# Patient Record
Sex: FEMALE | Race: ASIAN | NOT HISPANIC OR LATINO
[De-identification: names, ages, dates, MRNs, and addresses within clinical notes are randomized per-mention and may not be internally consistent; named-entity substitution may affect disease eponyms.]

---

## 2017-09-19 ENCOUNTER — NON-APPOINTMENT (OUTPATIENT)
Age: 72
End: 2017-09-19

## 2017-09-19 ENCOUNTER — APPOINTMENT (OUTPATIENT)
Dept: CARDIOLOGY | Facility: CLINIC | Age: 72
End: 2017-09-19
Payer: MEDICARE

## 2017-09-19 ENCOUNTER — FORM ENCOUNTER (OUTPATIENT)
Age: 72
End: 2017-09-19

## 2017-09-19 VITALS
RESPIRATION RATE: 16 BRPM | TEMPERATURE: 98 F | OXYGEN SATURATION: 98 % | HEART RATE: 47 BPM | DIASTOLIC BLOOD PRESSURE: 91 MMHG | WEIGHT: 109 LBS | BODY MASS INDEX: 22.27 KG/M2 | SYSTOLIC BLOOD PRESSURE: 128 MMHG

## 2017-09-19 DIAGNOSIS — I07.1 RHEUMATIC TRICUSPID INSUFFICIENCY: ICD-10-CM

## 2017-09-19 PROCEDURE — 99214 OFFICE O/P EST MOD 30 MIN: CPT

## 2017-09-19 PROCEDURE — 93306 TTE W/DOPPLER COMPLETE: CPT

## 2017-09-19 PROCEDURE — 93000 ELECTROCARDIOGRAM COMPLETE: CPT

## 2018-06-13 ENCOUNTER — APPOINTMENT (OUTPATIENT)
Dept: CARDIOLOGY | Facility: CLINIC | Age: 73
End: 2018-06-13

## 2018-06-20 ENCOUNTER — NON-APPOINTMENT (OUTPATIENT)
Age: 73
End: 2018-06-20

## 2018-06-20 ENCOUNTER — APPOINTMENT (OUTPATIENT)
Dept: CARDIOLOGY | Facility: CLINIC | Age: 73
End: 2018-06-20
Payer: MEDICARE

## 2018-06-20 VITALS
RESPIRATION RATE: 17 BRPM | SYSTOLIC BLOOD PRESSURE: 146 MMHG | BODY MASS INDEX: 22.88 KG/M2 | TEMPERATURE: 97.7 F | WEIGHT: 112 LBS | HEART RATE: 47 BPM | DIASTOLIC BLOOD PRESSURE: 65 MMHG | OXYGEN SATURATION: 97 %

## 2018-06-20 PROCEDURE — 99215 OFFICE O/P EST HI 40 MIN: CPT | Mod: 25

## 2018-06-20 PROCEDURE — 99214 OFFICE O/P EST MOD 30 MIN: CPT | Mod: 25

## 2018-06-20 PROCEDURE — 93015 CV STRESS TEST SUPVJ I&R: CPT

## 2018-06-20 PROCEDURE — 93000 ELECTROCARDIOGRAM COMPLETE: CPT | Mod: 59

## 2018-12-15 NOTE — PHYSICAL EXAM
[General Appearance - Well Developed] : well developed [Normal Appearance] : normal appearance [Well Groomed] : well groomed [General Appearance - Well Nourished] : well nourished [No Deformities] : no deformities [General Appearance - In No Acute Distress] : no acute distress [Normal Conjunctiva] : the conjunctiva exhibited no abnormalities [Eyelids - No Xanthelasma] : the eyelids demonstrated no xanthelasmas [Normal Oral Mucosa] : normal oral mucosa [No Oral Pallor] : no oral pallor [No Oral Cyanosis] : no oral cyanosis [Normal Jugular Venous A Waves Present] : normal jugular venous A waves present [Normal Jugular Venous V Waves Present] : normal jugular venous V waves present [No Jugular Venous Zavala A Waves] : no jugular venous zavala A waves [Respiration, Rhythm And Depth] : normal respiratory rhythm and effort [Exaggerated Use Of Accessory Muscles For Inspiration] : no accessory muscle use [Auscultation Breath Sounds / Voice Sounds] : lungs were clear to auscultation bilaterally [Heart Rate And Rhythm] : heart rate and rhythm were normal [Heart Sounds] : normal S1 and S2 [Murmurs] : no murmurs present [Arterial Pulses Normal] : the arterial pulses were normal [Edema] : no peripheral edema present [Abdomen Soft] : soft [Abdomen Tenderness] : non-tender [Abdomen Mass (___ Cm)] : no abdominal mass palpated [Abnormal Walk] : normal gait [Gait - Sufficient For Exercise Testing] : the gait was sufficient for exercise testing [Nail Clubbing] : no clubbing of the fingernails [Cyanosis, Localized] : no localized cyanosis [Petechial Hemorrhages (___cm)] : no petechial hemorrhages [] : no ischemic changes [Oriented To Time, Place, And Person] : oriented to person, place, and time [Affect] : the affect was normal [Mood] : the mood was normal [No Anxiety] : not feeling anxious

## 2018-12-17 ENCOUNTER — APPOINTMENT (OUTPATIENT)
Dept: CARDIOLOGY | Facility: CLINIC | Age: 73
End: 2018-12-17
Payer: MEDICARE

## 2018-12-17 VITALS
RESPIRATION RATE: 17 BRPM | TEMPERATURE: 97.6 F | SYSTOLIC BLOOD PRESSURE: 124 MMHG | OXYGEN SATURATION: 99 % | DIASTOLIC BLOOD PRESSURE: 73 MMHG | WEIGHT: 112 LBS | HEART RATE: 50 BPM | BODY MASS INDEX: 22.88 KG/M2

## 2018-12-17 PROCEDURE — 99214 OFFICE O/P EST MOD 30 MIN: CPT

## 2019-02-05 NOTE — REASON FOR VISIT
[Follow-Up - Clinic] : a clinic follow-up of [Abnormal ECG] : an abnormal ECG [FreeTextEntry1] : bradycardia

## 2019-02-05 NOTE — DISCUSSION/SUMMARY
[FreeTextEntry1] : 73-year-old female with HTN, HLD, bradycardia, osteoporosis, presents for followup.  Patient was last seen on  6/20/18.  Patient underwent a treadmill stress test and completed 5.5 minutes of Cedric protocol.  Her HR manuel appropriately, reaching 81% target HR.  She is on ASA and statin therapy for cardioprotection.  Patient has been stable.  Patient denies CP, SOB, palpitations, or lightheadedness. \par \par (1) Sinus bradycardia - Patient is asymptomatic.  Patient underwent a treadmill stress test in 6/2018 and completed 5.5 minutes of Cedric protocol.  Her HR manuel appropriately, reaching 81% target HR.  PPM is not indicated at this time and may be needed in the future.  I advised patient to look out for symptoms of shortness of breath or lightheadedness.\par \par (2) Followup - 1 year.

## 2019-02-05 NOTE — HISTORY OF PRESENT ILLNESS
[FreeTextEntry1] : 73-year-old female with HTN, HLD, bradycardia, osteoporosis, presents for followup.  Patient was last seen on  6/20/18.  Patient underwent a treadmill stress test and completed 5.5 minutes of Cedric protocol.  Her HR manuel appropriately, reaching 81% target HR.  She is on ASA and statin therapy for cardioprotection.  Patient has been stable.  Patient denies CP, SOB, palpitations, or lightheadedness.

## 2019-06-10 ENCOUNTER — APPOINTMENT (OUTPATIENT)
Dept: CARDIOLOGY | Facility: CLINIC | Age: 74
End: 2019-06-10
Payer: MEDICARE

## 2019-06-10 VITALS
RESPIRATION RATE: 17 BRPM | WEIGHT: 113 LBS | DIASTOLIC BLOOD PRESSURE: 66 MMHG | OXYGEN SATURATION: 98 % | SYSTOLIC BLOOD PRESSURE: 119 MMHG | HEART RATE: 45 BPM | TEMPERATURE: 97.6 F | BODY MASS INDEX: 23.09 KG/M2

## 2019-06-10 DIAGNOSIS — Z01.810 ENCOUNTER FOR PREPROCEDURAL CARDIOVASCULAR EXAMINATION: ICD-10-CM

## 2019-06-10 PROCEDURE — 93224 XTRNL ECG REC UP TO 48 HRS: CPT

## 2019-06-10 PROCEDURE — 99214 OFFICE O/P EST MOD 30 MIN: CPT

## 2019-06-10 NOTE — PHYSICAL EXAM
[General Appearance - Well Developed] : well developed [Normal Appearance] : normal appearance [Well Groomed] : well groomed [General Appearance - Well Nourished] : well nourished [No Deformities] : no deformities [General Appearance - In No Acute Distress] : no acute distress [Normal Conjunctiva] : the conjunctiva exhibited no abnormalities [Normal Oral Mucosa] : normal oral mucosa [Eyelids - No Xanthelasma] : the eyelids demonstrated no xanthelasmas [No Oral Pallor] : no oral pallor [No Oral Cyanosis] : no oral cyanosis [Normal Jugular Venous A Waves Present] : normal jugular venous A waves present [Normal Jugular Venous V Waves Present] : normal jugular venous V waves present [Respiration, Rhythm And Depth] : normal respiratory rhythm and effort [No Jugular Venous Zavala A Waves] : no jugular venous zavala A waves [Auscultation Breath Sounds / Voice Sounds] : lungs were clear to auscultation bilaterally [Exaggerated Use Of Accessory Muscles For Inspiration] : no accessory muscle use [Heart Sounds] : normal S1 and S2 [Heart Rate And Rhythm] : heart rate and rhythm were normal [Murmurs] : no murmurs present [Arterial Pulses Normal] : the arterial pulses were normal [Edema] : no peripheral edema present [Abdomen Soft] : soft [Abdomen Tenderness] : non-tender [Abdomen Mass (___ Cm)] : no abdominal mass palpated [Abnormal Walk] : normal gait [Gait - Sufficient For Exercise Testing] : the gait was sufficient for exercise testing [Nail Clubbing] : no clubbing of the fingernails [Cyanosis, Localized] : no localized cyanosis [] : no ischemic changes [Petechial Hemorrhages (___cm)] : no petechial hemorrhages [Oriented To Time, Place, And Person] : oriented to person, place, and time [Mood] : the mood was normal [Affect] : the affect was normal [No Anxiety] : not feeling anxious

## 2019-06-17 ENCOUNTER — NON-APPOINTMENT (OUTPATIENT)
Age: 74
End: 2019-06-17

## 2019-06-24 ENCOUNTER — RESULT REVIEW (OUTPATIENT)
Age: 74
End: 2019-06-24

## 2019-06-24 NOTE — REASON FOR VISIT
[Follow-Up - Clinic] : a clinic follow-up of [Abnormal ECG] : an abnormal ECG [FreeTextEntry1] : bradycardia, cardiac clearance prior to cataract surgery

## 2019-06-24 NOTE — HISTORY OF PRESENT ILLNESS
[FreeTextEntry1] : 74-year-old female with HTN, HLD, bradycardia, osteoporosis, presents for followup.  Patient was last seen on 12/17/18.  Patient underwent a treadmill stress test in 6/2018 and completed 5.5 minutes of Cedric protocol.  Her HR manuel appropriately, reaching 81% target HR.  PPM was not felt to be indicated at this time but may be needed in the future.  Patient needs cardiac clearance prior to cataract surgery.  Patient was noted to have HR of 43 on ECG.  Patient is asymptomatic.  Patient denies CP, SOB, palpitations, or lightheadedness.

## 2019-06-24 NOTE — DISCUSSION/SUMMARY
[FreeTextEntry1] : 74-year-old female with HTN, HLD, bradycardia, osteoporosis, presents for followup.  Patient was last seen on 12/17/18.  Patient underwent a treadmill stress test in 6/2018 and completed 5.5 minutes of Cedric protocol.  Her HR manuel appropriately, reaching 81% target HR.  PPM was not felt to be indicated at this time but may be needed in the future.  Patient needs cardiac clearance prior to cataract surgery.  Patient was noted to have HR of 43 on ECG.  Patient is asymptomatic.  Patient denies CP, SOB, palpitations, or lightheadedness.  \par \par (1) Cardiac clearance prior to cataract surgery - Patient has stable sinus bradycardia.  Patient denies exertional symptoms.  Patient wore a Holter and it showed average HR of 49, APC's and short runs of PAT (6 beats the longest), without significant pauses.  Patient is therefore cleared for surgery and anesthesia.  Low dose Atropine 0.25 mg may be given as needed if bradycardia is causing hypotension zuleima-operatively.  \par   \par (2) Sinus bradycardia - Patient is asymptomatic.  PPM is not indicated at this time but may be needed in the future. \par \par (3) Followup - 1 year.

## 2020-08-26 NOTE — PHYSICAL EXAM
[General Appearance - Well Developed] : well developed [Normal Appearance] : normal appearance [Well Groomed] : well groomed [No Deformities] : no deformities [General Appearance - Well Nourished] : well nourished [Normal Conjunctiva] : the conjunctiva exhibited no abnormalities [General Appearance - In No Acute Distress] : no acute distress [Eyelids - No Xanthelasma] : the eyelids demonstrated no xanthelasmas [Normal Oral Mucosa] : normal oral mucosa [No Oral Pallor] : no oral pallor [No Oral Cyanosis] : no oral cyanosis [Normal Jugular Venous A Waves Present] : normal jugular venous A waves present [Normal Jugular Venous V Waves Present] : normal jugular venous V waves present [No Jugular Venous Zavala A Waves] : no jugular venous zavala A waves [Respiration, Rhythm And Depth] : normal respiratory rhythm and effort [Exaggerated Use Of Accessory Muscles For Inspiration] : no accessory muscle use [Auscultation Breath Sounds / Voice Sounds] : lungs were clear to auscultation bilaterally [Heart Rate And Rhythm] : heart rate and rhythm were normal [Heart Sounds] : normal S1 and S2 [Arterial Pulses Normal] : the arterial pulses were normal [Murmurs] : no murmurs present [Edema] : no peripheral edema present [Abdomen Soft] : soft [Abdomen Tenderness] : non-tender [Abdomen Mass (___ Cm)] : no abdominal mass palpated [Abnormal Walk] : normal gait [Gait - Sufficient For Exercise Testing] : the gait was sufficient for exercise testing [Cyanosis, Localized] : no localized cyanosis [Nail Clubbing] : no clubbing of the fingernails [Petechial Hemorrhages (___cm)] : no petechial hemorrhages [] : no ischemic changes [Oriented To Time, Place, And Person] : oriented to person, place, and time [Affect] : the affect was normal [Mood] : the mood was normal [No Anxiety] : not feeling anxious

## 2020-08-27 ENCOUNTER — NON-APPOINTMENT (OUTPATIENT)
Age: 75
End: 2020-08-27

## 2020-08-27 ENCOUNTER — APPOINTMENT (OUTPATIENT)
Dept: CARDIOLOGY | Facility: CLINIC | Age: 75
End: 2020-08-27
Payer: MEDICARE

## 2020-08-27 VITALS
SYSTOLIC BLOOD PRESSURE: 148 MMHG | DIASTOLIC BLOOD PRESSURE: 76 MMHG | HEART RATE: 66 BPM | OXYGEN SATURATION: 98 % | WEIGHT: 109 LBS | BODY MASS INDEX: 22.27 KG/M2 | TEMPERATURE: 97.3 F | RESPIRATION RATE: 18 BRPM

## 2020-08-27 DIAGNOSIS — R94.31 ABNORMAL ELECTROCARDIOGRAM [ECG] [EKG]: ICD-10-CM

## 2020-08-27 PROCEDURE — 93306 TTE W/DOPPLER COMPLETE: CPT

## 2020-08-27 PROCEDURE — 99214 OFFICE O/P EST MOD 30 MIN: CPT

## 2020-08-27 PROCEDURE — 93000 ELECTROCARDIOGRAM COMPLETE: CPT

## 2020-09-01 ENCOUNTER — NON-APPOINTMENT (OUTPATIENT)
Age: 75
End: 2020-09-01

## 2021-09-25 PROBLEM — I47.1 PAT (PAROXYSMAL ATRIAL TACHYCARDIA): Status: ACTIVE | Noted: 2018-12-28

## 2021-09-25 NOTE — PHYSICAL EXAM
[General Appearance - Well Developed] : well developed [Normal Appearance] : normal appearance [Well Groomed] : well groomed [No Deformities] : no deformities [General Appearance - Well Nourished] : well nourished [General Appearance - In No Acute Distress] : no acute distress [Normal Conjunctiva] : the conjunctiva exhibited no abnormalities [Eyelids - No Xanthelasma] : the eyelids demonstrated no xanthelasmas [Normal Oral Mucosa] : normal oral mucosa [No Oral Pallor] : no oral pallor [No Oral Cyanosis] : no oral cyanosis [Normal Jugular Venous A Waves Present] : normal jugular venous A waves present [Normal Jugular Venous V Waves Present] : normal jugular venous V waves present [No Jugular Venous Zavala A Waves] : no jugular venous zavala A waves [Respiration, Rhythm And Depth] : normal respiratory rhythm and effort [Exaggerated Use Of Accessory Muscles For Inspiration] : no accessory muscle use [Heart Rate And Rhythm] : heart rate and rhythm were normal [Auscultation Breath Sounds / Voice Sounds] : lungs were clear to auscultation bilaterally [Heart Sounds] : normal S1 and S2 [Murmurs] : no murmurs present [Arterial Pulses Normal] : the arterial pulses were normal [Edema] : no peripheral edema present [Abdomen Soft] : soft [Abdomen Tenderness] : non-tender [Abdomen Mass (___ Cm)] : no abdominal mass palpated [Abnormal Walk] : normal gait [Gait - Sufficient For Exercise Testing] : the gait was sufficient for exercise testing [Nail Clubbing] : no clubbing of the fingernails [Cyanosis, Localized] : no localized cyanosis [Petechial Hemorrhages (___cm)] : no petechial hemorrhages [] : no ischemic changes [Oriented To Time, Place, And Person] : oriented to person, place, and time [Affect] : the affect was normal [Mood] : the mood was normal [No Anxiety] : not feeling anxious

## 2021-09-27 ENCOUNTER — APPOINTMENT (OUTPATIENT)
Dept: CARDIOLOGY | Facility: CLINIC | Age: 76
End: 2021-09-27
Payer: MEDICARE

## 2021-09-27 VITALS
OXYGEN SATURATION: 98 % | TEMPERATURE: 97.6 F | RESPIRATION RATE: 18 BRPM | WEIGHT: 111 LBS | SYSTOLIC BLOOD PRESSURE: 137 MMHG | HEART RATE: 50 BPM | BODY MASS INDEX: 22.68 KG/M2 | DIASTOLIC BLOOD PRESSURE: 59 MMHG

## 2021-09-27 DIAGNOSIS — I47.1 SUPRAVENTRICULAR TACHYCARDIA: ICD-10-CM

## 2021-09-27 PROCEDURE — 99214 OFFICE O/P EST MOD 30 MIN: CPT

## 2021-09-30 ENCOUNTER — NON-APPOINTMENT (OUTPATIENT)
Age: 76
End: 2021-09-30

## 2022-07-15 NOTE — REASON FOR VISIT
[Abnormal ECG] : an abnormal ECG [FreeTextEntry1] : 74-year-old female with HTN, HLD, bradycardia, osteoporosis, presents for followup.  \par \par Patient was last seen on 6/10/19 for cardiac clearance prior to cataract surgery.   Patient wore a Holter and it showed average HR of 49, APC's and short runs of PAT (6 beats the longest), without significant pauses.  \par \par

## 2022-07-15 NOTE — DISCUSSION/SUMMARY
[FreeTextEntry1] : 74-year-old female with HTN, HLD, bradycardia, osteoporosis, presents for followup.  \par \par Patient was last seen on 6/10/19 for cardiac clearance prior to cataract surgery.   Patient wore a Holter and it showed average HR of 49, APC's and short runs of PAT (6 beats the longest), without significant pauses.  \par \par 8/27/20 - Patient reports slow HR, she was recommended to get a pacemaker by Dr. Charles Jimenez in the past. Patient denies CP, SOB, palpitations, or lightheadedness. Patient does not walk fast due to leg pain.   I advised patient to wear a Holter monitor. I advised patient to undergo an echocardiogram.  Patient wore a Holter and it showed average HR of 54, APC's, PVC's, and short runs of PAT (10 beats the longest). No significant pauses noted. PPM not indicated at this time. PPM may be indicated if beta-blocker is needed to treat PAT.  Patient underwent an echocardiogram and it showed normal LV function with moderate AR.  \par \par (1) Sinus bradycardia - Patient is asymptomatic.  Patient underwent an echocardiogram and it showed normal LV function with moderate AR.  Patient wore a Holter and it showed average HR of 54, APC's, PVC's, and short runs of PAT (10 beats the longest). No significant pauses noted.  PPM not indicated at this time.  PPM may be indicated if beta-blocker is needed to treat PAT.  \par \par (2) Followup - 1 year.

## 2022-07-15 NOTE — HISTORY OF PRESENT ILLNESS
[FreeTextEntry1] : \par 9/27/21 - Patient reports feeling well. Patient denies CP, SOB, palpitations, or lightheadedness. I advised patient to wear a Holter monitor for bradycardia. Patient reports that she still walks slow due to leg pain.  Patient wore a Holter and it showed average HR 49, rare PVCs, couplet, triplet, bigeminy and trigeminy. 1.85% PACs, couplets, and 4 PATs, the longest 10 beats at a rate of 102, the fastest 3 beats at a rate of 139.  No significant pauses.  PPM not indicated this time.\par \par 8/27/20 - Patient reports slow HR, she was recommended to get a pacemaker by Dr. Charles Jimenez in the past. Patient denies CP, SOB, palpitations, or lightheadedness. Patient does not walk fast due to leg pain. I advised patient to wear a Holter monitor. I advised patient to undergo an echocardiogram. Patient wore a Holter and it showed average HR of 54, APC's, PVC's, and short runs of PAT (10 beats the longest). No significant pauses noted. PPM not indicated at this time. PPM may be indicated if beta-blocker is needed to treat PAT. Patient underwent an echocardiogram and it showed normal LV function with moderate AR. \par

## 2022-07-15 NOTE — DISCUSSION/SUMMARY
[FreeTextEntry1] : 76-year-old female with HTN, HLD, bradycardia, osteoporosis, presents for followup.  \par \par Patient was last seen on 8/27/20.   Patient underwent an echocardiogram and it showed normal LV function with moderate AR.  Patient wore a Holter and it showed average HR of 54, APC's, PVC's, and short runs of PAT (10 beats the longest). No significant pauses noted. \par \par 9/27/21 - Patient reports feeling well. Patient denies CP, SOB, palpitations, or lightheadedness. I advised patient to wear a Holter monitor for bradycardia. Patient reports that she still walks slow due to leg pain.  Patient wore a Holter and it showed average HR 49, rare PVCs, couplet, triplet, bigeminy and trigeminy. 1.85% PACs, couplets, and 4 PATs, the longest 10 beats at a rate of 102, the fastest 3 beats at a rate of 139.  No significant pauses.  PPM not indicated this time.\par \par (1) Sinus bradycardia - Patient is asymptomatic. Patient wore a Holter and it showed average HR 49, rare PVCs, couplet, triplet, bigeminy and trigeminy. 1.85% PACs, couplets, and 4 PATs, the longest 10 beats at a rate of 102, the fastest 3 beats at a rate of 139.  No significant pauses.  PPM not indicated this time but may be needed in the future.\par \par (2) Followup - 1 year.

## 2022-07-15 NOTE — REASON FOR VISIT
[FreeTextEntry1] : 76-year-old female with HTN, HLD, bradycardia, osteoporosis, presents for followup.  \par \par Patient was last seen on 8/27/20.   Patient underwent an echocardiogram and it showed normal LV function with moderate AR.  Patient wore a Holter and it showed average HR of 54, APC's, PVC's, and short runs of PAT (10 beats the longest). No significant pauses noted. \par \par

## 2022-07-15 NOTE — HISTORY OF PRESENT ILLNESS
[FreeTextEntry1] : \par 8/27/20 - Patient reports slow HR, she was recommended to get a pacemaker by Dr. Charles Jimenez in the past. Patient denies CP, SOB, palpitations, or lightheadedness. Patient does not walk fast due to leg pain.   I advised patient to wear a Holter monitor. I advised patient to undergo an echocardiogram.  Patient wore a Holter and it showed average HR of 54, APC's, PVC's, and short runs of PAT (10 beats the longest). No significant pauses noted. PPM not indicated at this time. PPM may be indicated if beta-blocker is needed to treat PAT.  Patient underwent an echocardiogram and it showed normal LV function with moderate AR.

## 2022-10-12 PROBLEM — R00.1 BRADYCARDIA: Status: ACTIVE | Noted: 2018-07-01

## 2022-10-14 ENCOUNTER — APPOINTMENT (OUTPATIENT)
Dept: CARDIOLOGY | Facility: CLINIC | Age: 77
End: 2022-10-14

## 2022-10-14 ENCOUNTER — NON-APPOINTMENT (OUTPATIENT)
Age: 77
End: 2022-10-14

## 2022-10-14 VITALS
OXYGEN SATURATION: 99 % | BODY MASS INDEX: 22.88 KG/M2 | SYSTOLIC BLOOD PRESSURE: 144 MMHG | DIASTOLIC BLOOD PRESSURE: 72 MMHG | WEIGHT: 112 LBS | HEART RATE: 57 BPM | RESPIRATION RATE: 18 BRPM | TEMPERATURE: 97.2 F

## 2022-10-14 DIAGNOSIS — R00.1 BRADYCARDIA, UNSPECIFIED: ICD-10-CM

## 2022-10-14 PROCEDURE — 93000 ELECTROCARDIOGRAM COMPLETE: CPT

## 2022-10-14 PROCEDURE — 93306 TTE W/DOPPLER COMPLETE: CPT

## 2022-10-14 PROCEDURE — 99214 OFFICE O/P EST MOD 30 MIN: CPT

## 2022-10-29 NOTE — REASON FOR VISIT
[FreeTextEntry1] : 77-year-old female with HTN, HLD, bradycardia, osteoporosis, presents for followup.  \par \par Patient was last seen on 9/27/21 for followup.  I advised patient to wear a Holter monitor for bradycardia.   Patient wore a Holter and it showed average HR 49, rare PVCs, couplet, triplet, bigeminy and trigeminy. 1.85% PACs, couplets, and 4 PATs, the longest 10 beats at a rate of 102, the fastest 3 beats at a rate of 139.  No significant pauses.  PPM not indicated this time.\par \par Patient wore a Holter 9/27/21 and it showed average HR 49, rare PVCs, couplet, triplet, bigeminy and trigeminy. 1.85% PACs, couplets, and 4 PATs, the longest 10 beats at a rate of 102, the fastest 3 beats at a rate of 139.  No significant pauses.\par \par Patient underwent an echocardiogram 8/27/20 and it showed normal LV function with moderate AR.  \par \par Patient wore a Holter 8/27/20 and it showed average HR of 54, APC's, PVC's, and short runs of PAT (10 beats the longest). No significant pauses noted. \par \par

## 2022-10-29 NOTE — HISTORY OF PRESENT ILLNESS
[FreeTextEntry1] : 10/14/22 - Patient had MRI that showed spinal stenosis that is causing bilateral LE weakness. She is worried about having to get surgery for it.  Patient denies CP, SOB, palpitations, or lightheadedness. Her HR has been stable. She reports walking very slow and weakness. I advised patient to see rehab and do exercises.  I advised patient to undergo an echocardiogram. FU in one year. \par \par 9/27/21 - Patient reports feeling well. Patient denies CP, SOB, palpitations, or lightheadedness. I advised patient to wear a Holter monitor for bradycardia. Patient reports that she still walks slow due to leg pain.  Patient wore a Holter and it showed average HR 49, rare PVCs, couplet, triplet, bigeminy and trigeminy. 1.85% PACs, couplets, and 4 PATs, the longest 10 beats at a rate of 102, the fastest 3 beats at a rate of 139.  No significant pauses.  PPM not indicated this time.\par \par 8/27/20 - Patient reports slow HR, she was recommended to get a pacemaker by Dr. Charles Jimenez in the past. Patient denies CP, SOB, palpitations, or lightheadedness. Patient does not walk fast due to leg pain. I advised patient to wear a Holter monitor. I advised patient to undergo an echocardiogram. Patient wore a Holter and it showed average HR of 54, APC's, PVC's, and short runs of PAT (10 beats the longest). No significant pauses noted. PPM not indicated at this time. PPM may be indicated if beta-blocker is needed to treat PAT. Patient underwent an echocardiogram and it showed normal LV function with moderate AR. \par

## 2024-01-06 PROBLEM — I35.1 AORTIC VALVE REGURGITATION, NONRHEUMATIC: Status: ACTIVE | Noted: 2017-09-19

## 2024-01-06 NOTE — PHYSICAL EXAM
[General Appearance - Well Developed] : well developed [Normal Appearance] : normal appearance [Well Groomed] : well groomed [General Appearance - Well Nourished] : well nourished [No Deformities] : no deformities [General Appearance - In No Acute Distress] : no acute distress [Normal Conjunctiva] : the conjunctiva exhibited no abnormalities [Eyelids - No Xanthelasma] : the eyelids demonstrated no xanthelasmas [Normal Oral Mucosa] : normal oral mucosa [No Oral Pallor] : no oral pallor [No Oral Cyanosis] : no oral cyanosis [Normal Jugular Venous A Waves Present] : normal jugular venous A waves present [Normal Jugular Venous V Waves Present] : normal jugular venous V waves present [No Jugular Venous Zavala A Waves] : no jugular venous zavala A waves [Respiration, Rhythm And Depth] : normal respiratory rhythm and effort [Auscultation Breath Sounds / Voice Sounds] : lungs were clear to auscultation bilaterally [Exaggerated Use Of Accessory Muscles For Inspiration] : no accessory muscle use [Heart Rate And Rhythm] : heart rate and rhythm were normal [Heart Sounds] : normal S1 and S2 [Murmurs] : no murmurs present [Arterial Pulses Normal] : the arterial pulses were normal [Edema] : no peripheral edema present [Abdomen Soft] : soft [Abdomen Tenderness] : non-tender [Abdomen Mass (___ Cm)] : no abdominal mass palpated [Abnormal Walk] : normal gait [Gait - Sufficient For Exercise Testing] : the gait was sufficient for exercise testing [Cyanosis, Localized] : no localized cyanosis [Nail Clubbing] : no clubbing of the fingernails [Petechial Hemorrhages (___cm)] : no petechial hemorrhages [Oriented To Time, Place, And Person] : oriented to person, place, and time [] : no ischemic changes [Affect] : the affect was normal [Mood] : the mood was normal [No Anxiety] : not feeling anxious

## 2024-01-11 ENCOUNTER — APPOINTMENT (OUTPATIENT)
Dept: CARDIOLOGY | Facility: CLINIC | Age: 79
End: 2024-01-11
Payer: MEDICARE

## 2024-01-11 ENCOUNTER — NON-APPOINTMENT (OUTPATIENT)
Age: 79
End: 2024-01-11

## 2024-01-11 VITALS
HEART RATE: 54 BPM | SYSTOLIC BLOOD PRESSURE: 178 MMHG | WEIGHT: 114 LBS | BODY MASS INDEX: 23.29 KG/M2 | TEMPERATURE: 97.1 F | OXYGEN SATURATION: 96 % | DIASTOLIC BLOOD PRESSURE: 73 MMHG | RESPIRATION RATE: 18 BRPM

## 2024-01-11 DIAGNOSIS — I35.1 NONRHEUMATIC AORTIC (VALVE) INSUFFICIENCY: ICD-10-CM

## 2024-01-11 DIAGNOSIS — I10 ESSENTIAL (PRIMARY) HYPERTENSION: ICD-10-CM

## 2024-01-11 PROCEDURE — 93306 TTE W/DOPPLER COMPLETE: CPT

## 2024-01-11 PROCEDURE — 93000 ELECTROCARDIOGRAM COMPLETE: CPT

## 2024-01-11 PROCEDURE — 99214 OFFICE O/P EST MOD 30 MIN: CPT | Mod: 25

## 2024-01-15 NOTE — HISTORY OF PRESENT ILLNESS
[FreeTextEntry1] : 1/11/24 - Patient denies CP, SOB, palpitations, or lightheadedness. Patient reports elevated BP. Patient reports that she is not taking Lisinopril 20 mg. She is on ASA 81 mg, Simvastatin 20 mg. Patient had seen another cardiologist last year. I advised patient to start on Lisinopril 10 mg. I advised patient to undergo an echocardiogram.   10/14/22 - Patient had MRI that showed spinal stenosis that is causing bilateral LE weakness. She is worried about having to get surgery for it.  Patient denies CP, SOB, palpitations, or lightheadedness. Her HR has been stable. She reports walking very slow and weakness. I advised patient to see rehab and do exercises.  I advised patient to undergo an echocardiogram. FU in one year.   9/27/21 - Patient reports feeling well. Patient denies CP, SOB, palpitations, or lightheadedness. I advised patient to wear a Holter monitor for bradycardia. Patient reports that she still walks slow due to leg pain.  Patient wore a Holter and it showed average HR 49, rare PVCs, couplet, triplet, bigeminy and trigeminy. 1.85% PACs, couplets, and 4 PATs, the longest 10 beats at a rate of 102, the fastest 3 beats at a rate of 139.  No significant pauses.  PPM not indicated this time.  8/27/20 - Patient reports slow HR, she was recommended to get a pacemaker by Dr. Charles Jimenez in the past. Patient denies CP, SOB, palpitations, or lightheadedness. Patient does not walk fast due to leg pain. I advised patient to wear a Holter monitor. I advised patient to undergo an echocardiogram. Patient wore a Holter and it showed average HR of 54, APC's, PVC's, and short runs of PAT (10 beats the longest). No significant pauses noted. PPM not indicated at this time. PPM may be indicated if beta-blocker is needed to treat PAT. Patient underwent an echocardiogram and it showed normal LV function with moderate AR.

## 2024-01-15 NOTE — REASON FOR VISIT
[FreeTextEntry1] : 77-year-old female with AR, HTN, HLD, bradycardia, osteoporosis, presents for followup.    Patient was last seen on 10/14/22 - Patient had MRI that showed spinal stenosis that is causing bilateral LE weakness. She is worried about having to get surgery for it. Patient denies CP, SOB, palpitations, or lightheadedness. Her HR has been stable. She reports walking very slow and weakness. I advised patient to see rehab and do exercises. I advised patient to undergo an echocardiogram. FU in one year.  Patient underwent an echocardiogram and it showed normal LV function with moderate AR.   Patient wore a Holter 9/27/21 and it showed average HR 49, rare PVCs, couplet, triplet, bigeminy and trigeminy. 1.85% PACs, couplets, and 4 PATs, the longest 10 beats at a rate of 102, the fastest 3 beats at a rate of 139.  No significant pauses.  Patient underwent an echocardiogram 8/27/20 and it showed normal LV function with moderate AR.    Patient wore a Holter 8/27/20 and it showed average HR of 54, APC's, PVC's, and short runs of PAT (10 beats the longest). No significant pauses noted.

## 2025-03-03 ENCOUNTER — APPOINTMENT (OUTPATIENT)
Dept: CARDIOLOGY | Facility: CLINIC | Age: 80
End: 2025-03-03

## 2025-03-06 ENCOUNTER — APPOINTMENT (OUTPATIENT)
Dept: CARDIOLOGY | Facility: CLINIC | Age: 80
End: 2025-03-06
Payer: MEDICARE

## 2025-03-06 ENCOUNTER — APPOINTMENT (OUTPATIENT)
Dept: CARDIOLOGY | Facility: CLINIC | Age: 80
End: 2025-03-06

## 2025-03-06 ENCOUNTER — NON-APPOINTMENT (OUTPATIENT)
Age: 80
End: 2025-03-06

## 2025-03-06 VITALS
WEIGHT: 114 LBS | SYSTOLIC BLOOD PRESSURE: 130 MMHG | BODY MASS INDEX: 23.29 KG/M2 | RESPIRATION RATE: 18 BRPM | OXYGEN SATURATION: 96 % | HEART RATE: 53 BPM | DIASTOLIC BLOOD PRESSURE: 75 MMHG

## 2025-03-06 DIAGNOSIS — R00.1 BRADYCARDIA, UNSPECIFIED: ICD-10-CM

## 2025-03-06 DIAGNOSIS — I10 ESSENTIAL (PRIMARY) HYPERTENSION: ICD-10-CM

## 2025-03-06 DIAGNOSIS — I35.1 NONRHEUMATIC AORTIC (VALVE) INSUFFICIENCY: ICD-10-CM

## 2025-03-06 PROCEDURE — 93000 ELECTROCARDIOGRAM COMPLETE: CPT

## 2025-03-06 PROCEDURE — 93306 TTE W/DOPPLER COMPLETE: CPT

## 2025-03-06 PROCEDURE — 99214 OFFICE O/P EST MOD 30 MIN: CPT
